# Patient Record
Sex: FEMALE | Race: WHITE | NOT HISPANIC OR LATINO | Employment: PART TIME | URBAN - NONMETROPOLITAN AREA
[De-identification: names, ages, dates, MRNs, and addresses within clinical notes are randomized per-mention and may not be internally consistent; named-entity substitution may affect disease eponyms.]

---

## 2019-12-22 ENCOUNTER — HOSPITAL ENCOUNTER (EMERGENCY)
Facility: HOSPITAL | Age: 24
Discharge: HOME | End: 2019-12-22
Attending: EMERGENCY MEDICINE | Admitting: EMERGENCY MEDICINE
Payer: COMMERCIAL

## 2019-12-22 VITALS
TEMPERATURE: 97.8 F | HEART RATE: 77 BPM | DIASTOLIC BLOOD PRESSURE: 74 MMHG | SYSTOLIC BLOOD PRESSURE: 134 MMHG | WEIGHT: 208 LBS | OXYGEN SATURATION: 98 % | RESPIRATION RATE: 21 BRPM

## 2019-12-22 DIAGNOSIS — O20.0 THREATENED MISCARRIAGE IN EARLY PREGNANCY: ICD-10-CM

## 2019-12-22 DIAGNOSIS — Z34.90 INTRAUTERINE PREGNANCY: Primary | ICD-10-CM

## 2019-12-22 PROCEDURE — 99284 EMERGENCY DEPT VISIT MOD MDM: CPT | Performed by: EMERGENCY MEDICINE

## 2019-12-22 PROCEDURE — 99284 EMERGENCY DEPT VISIT MOD MDM: CPT

## 2019-12-22 NOTE — ED PROVIDER NOTES
History  Chief Complaint   Patient presents with    Pregnancy Problem     pt is 7 weeks - was at [facility] yesterday and stated they just seen a sac and was unable to tell if pt is miscarrying  pt states she is with bleeding and contractions type feelings  Patient is a 52-year-old female who presents the emergency department complaining of vaginal bleeding started yesterday she is currently 7 weeks pregnant was seen in outside hospital ED for the same yesterday had quantitative HCG and OB ultrasound done found to have a empty gestational sac consistent with early pregnancy or possibly abnormal yesterday still having small bleeding and cramping today  No prior miscarriage  Patient is   History provided by:  Patient  Vaginal Bleeding   Quality:  Clots and dark red  Severity:  Moderate  Onset quality:  Gradual  Duration:  2 days  Timing:  Intermittent  Progression:  Waxing and waning  Chronicity:  New  Associated symptoms: no abdominal pain, no dizziness, no dysuria, no fatigue, no fever and no nausea        Prior to Admission Medications   Prescriptions Last Dose Informant Patient Reported? Taking? Prenatal Vit-Fe Fumarate-FA (PRENATAL 1+1 PO)   Yes Yes   Sig: Take by mouth      Facility-Administered Medications: None       History reviewed  No pertinent past medical history  Past Surgical History:   Procedure Laterality Date     SECTION      KNEE ARTHROSCOPY W/ MENISCAL REPAIR      left    TONSILLECTOMY      WISDOM TOOTH EXTRACTION         History reviewed  No pertinent family history  I have reviewed and agree with the history as documented  Social History     Tobacco Use    Smoking status: Current Some Day Smoker    Smokeless tobacco: Never Used   Substance Use Topics    Alcohol use: Yes     Comment: social    Drug use: Never        Review of Systems   Constitutional: Negative for activity change, appetite change, chills, fatigue and fever     HENT: Negative for congestion, ear pain, rhinorrhea and sore throat  Eyes: Negative for discharge, redness and visual disturbance  Respiratory: Negative for cough, chest tightness, shortness of breath and wheezing  Cardiovascular: Negative for chest pain and palpitations  Gastrointestinal: Negative for abdominal pain, constipation, diarrhea, nausea and vomiting  Endocrine: Negative for polydipsia and polyuria  Genitourinary: Positive for pelvic pain and vaginal bleeding  Negative for difficulty urinating, dysuria, frequency, hematuria and urgency  Musculoskeletal: Negative for arthralgias and myalgias  Skin: Negative for color change, pallor and rash  Neurological: Negative for dizziness, weakness, light-headedness, numbness and headaches  Hematological: Negative for adenopathy  Does not bruise/bleed easily  All other systems reviewed and are negative  Physical Exam  Physical Exam   Constitutional: She is oriented to person, place, and time  She appears well-developed and well-nourished  HENT:   Head: Normocephalic and atraumatic  Right Ear: External ear normal    Left Ear: External ear normal    Nose: Nose normal    Mouth/Throat: Oropharynx is clear and moist    Eyes: Pupils are equal, round, and reactive to light  Conjunctivae and EOM are normal    Neck: Normal range of motion  Neck supple  Cardiovascular: Normal rate, regular rhythm, normal heart sounds and intact distal pulses  Pulmonary/Chest: Effort normal and breath sounds normal  No respiratory distress  She has no wheezes  She has no rales  She exhibits no tenderness  Abdominal: Soft  Bowel sounds are normal  She exhibits no distension  There is no tenderness  There is no guarding  Musculoskeletal: Normal range of motion  Neurological: She is alert and oriented to person, place, and time  No cranial nerve deficit or sensory deficit  Skin: Skin is warm and dry  Psychiatric: She has a normal mood and affect     Nursing note and vitals reviewed  Vital Signs  ED Triage Vitals   Temperature Pulse Respirations Blood Pressure SpO2   12/22/19 1850 12/22/19 1850 12/22/19 1850 12/22/19 1850 12/22/19 1850   97 8 °F (36 6 °C) 76 18 135/75 98 %      Temp src Heart Rate Source Patient Position - Orthostatic VS BP Location FiO2 (%)   -- 12/22/19 1917 12/22/19 1917 12/22/19 1917 --    Monitor Lying Right arm       Pain Score       --                  Vitals:    12/22/19 1850 12/22/19 1917   BP: 135/75 134/74   Pulse: 76 77   Patient Position - Orthostatic VS:  Lying         Visual Acuity      ED Medications  Medications - No data to display    Diagnostic Studies  Results Reviewed     None                 No orders to display              Procedures  Procedures         ED Course        ultrasound OB transvaginal from 12/21/2019 reviewed:   IMPRESSION: Small intrauterine 0 7 cm empty gestational sac consistent with very  early pregnancy or something abnormal  Correlate with appropriate followup  Small prominent follicular cysts of both ovaries  MDM  Number of Diagnoses or Management Options  Intrauterine pregnancy: new and does not require workup  Threatened miscarriage in early pregnancy: new and does not require workup  Diagnosis management comments: Too early to auscultate fetal heart tones at this point patient is Rh positive no RhoGAM indicated  Workup at this point has been completed she does have an early gestational sac versus something abnormal seen on ultrasound yesterday and is scheduled to have a follow-up repeat beta quantitative HCG which she has a prescription for  At this point today there is no indication to repeat an HCG as it has not been 48 hours yet and nino elicit much information or impact management at this point    Patient counseled in depth regarding her diagnosis of threatened miscarriage and advised pelvic rest plenty of fluids Tylenol as needed and prompt follow-up with her obstetrician for further evaluation and management  Patient is comfortable in agreement with this plan and now and her stands her diagnosis and the management that is indicated for it  She will follow up promptly as advised return precautions and anticipatory guidance discussed  Amount and/or Complexity of Data Reviewed  Decide to obtain previous medical records or to obtain history from someone other than the patient: yes  Review and summarize past medical records: yes    Risk of Complications, Morbidity, and/or Mortality  Presenting problems: moderate  Management options: low    Patient Progress  Patient progress: stable        Disposition  Final diagnoses:   Intrauterine pregnancy   Threatened miscarriage in early pregnancy     Time reflects when diagnosis was documented in both MDM as applicable and the Disposition within this note     Time User Action Codes Description Comment    12/22/2019  7:09 PM Sirisha Younger Add [Z34 90] Intrauterine pregnancy     12/22/2019  7:09 PM Masha Harris Add [O20 0] Threatened miscarriage in early pregnancy       ED Disposition     ED Disposition Condition Date/Time Comment    Discharge Stable Sun Dec 22, 2019  7:08  [facility] discharge to home/self care  Follow-up Information     Follow up With Specialties Details Why [address], CRNP Nurse Practitioner Schedule an appointment as soon as possible for a visit in 2 days  [address] [facility] [address], DO  Schedule an appointment as soon as possible for a visit in 3 days  [address] [facility] [address]  [phone]            Discharge Medication List as of 12/22/2019  7:10 PM      CONTINUE these medications which have NOT CHANGED    Details   Prenatal Vit-Fe Fumarate-FA (PRENATAL 1+1 PO) Take by mouth, Historical Med           No discharge procedures on file      ED Provider  Electronically Signed by           Santos Cuevas DO  12/22/19 [address]